# Patient Record
Sex: FEMALE | Race: WHITE
[De-identification: names, ages, dates, MRNs, and addresses within clinical notes are randomized per-mention and may not be internally consistent; named-entity substitution may affect disease eponyms.]

---

## 2019-01-18 ENCOUNTER — HOSPITAL ENCOUNTER (EMERGENCY)
Dept: HOSPITAL 25 - UCEAST | Age: 59
Discharge: HOME | End: 2019-01-18
Payer: SELF-PAY

## 2019-01-18 VITALS — SYSTOLIC BLOOD PRESSURE: 142 MMHG | DIASTOLIC BLOOD PRESSURE: 84 MMHG

## 2019-01-18 DIAGNOSIS — Z88.0: ICD-10-CM

## 2019-01-18 DIAGNOSIS — H02.841: Primary | ICD-10-CM

## 2019-01-18 DIAGNOSIS — R51: ICD-10-CM

## 2019-01-18 DIAGNOSIS — F17.210: ICD-10-CM

## 2019-01-18 PROCEDURE — 70450 CT HEAD/BRAIN W/O DYE: CPT

## 2019-01-18 PROCEDURE — G0463 HOSPITAL OUTPT CLINIC VISIT: HCPCS

## 2019-01-18 PROCEDURE — 99212 OFFICE O/P EST SF 10 MIN: CPT

## 2019-01-18 PROCEDURE — 96372 THER/PROPH/DIAG INJ SC/IM: CPT

## 2019-01-18 NOTE — UC
Eye Complaint HPI





- HPI Summary


HPI Summary: 





The patient is a 58-year-old female that for the past few days has had some 

minor irritation of her right eye.  She states it feels a little irritated at 

the temporal aspect of the upper lid.  She denies any foreign body sensation.  

He has had some tearing.  This morning she woke up with her right eye swollen 

almost completely closed.  She states that she has a right hemicranial 

headache.  She states that the skin of her right eye and temporal are 

sensitive.  She denies any rash.  She denies any photophobia or change in her 

vision.





- History of Current Complaint


Chief Complaint: UCEye


Stated Complaint: EYE COMPLAINT


Time Seen by Provider: 01/18/19 09:03


Hx Obtained From: Patient


Onset/Duration: Gradual Onset, Lasting Days, Worse Since - since this AM


Timing: Constant


Severity Initially: Mild


Severity Currently: Severe


Pain Intensity: 10


Pain Scale Used: 0-10 Numeric


Location of Injury: Eye Lid (upper) - R


Character: Sharp


Aggravating Factor(s): Nothing


Associated Signs And Symptoms: Positive: Drainage (Clear), Swelling - of right 

lids.  Negative: Photophobia, Drainage (Purulent), Vision Impairment Bilateral, 

Vision Impairment Right, Vision Impairment Left, Fever





- Allergies/Home Medications


Allergies/Adverse Reactions: 


 Allergies











Allergy/AdvReac Type Severity Reaction Status Date / Time


 


Penicillins Allergy  Hives Verified 01/18/19 09:00














PMH/Surg Hx/FS Hx/Imm Hx


Previously Healthy: Yes


Cancer History: Breast Cancer





- Surgical History


Surgical History: Yes


Surgery Procedure, Year, and Place: C SECTION, 1987, [facility].  LEFT 

BREAST MASTECTOMY, 2004, [facility]





- Family History


Known Family History: Positive: Hypertension





- Social History


Alcohol Use: Occasionally


Substance Use Type: Marijuana


Smoking Status (MU): Heavy Every Day Tobacco Smoker


Type: Cigarettes


Amount Used/How Often: 1 ppd


Household Exposure Type: Cigarettes





Review of Systems


All Other Systems Reviewed And Are Negative: Yes


Constitutional: Positive: Negative


Skin: Positive: Negative


Eyes: Positive: Drainage - tearing, Eye Redness - R.  Negative: Blurred Vision, 

Diplopia, Photophobia


ENT: Positive: Negative


Respiratory: Positive: Negative


Cardiovascular: Positive: Negative


Gastrointestinal: Positive: Vomiting, Nausea


Genitourinary: Positive: Negative


Motor: Positive: Negative


Neurovascular: Positive: Negative


Musculoskeletal: Positive: Negative


Neurological: Positive: Headache


Psychological: Positive: Negative





Physical Exam


Triage Information Reviewed: Yes


Appearance: Well-Appearing, Pain Distress, Thin


Vital Signs: 


 Initial Vital Signs











Temp  97.6 F   01/18/19 08:55


 


Pulse  67   01/18/19 08:55


 


Resp  20   01/18/19 08:55


 


BP  142/84   01/18/19 08:55


 


Pulse Ox  97   01/18/19 08:55











Eyes: Positive: Conjunctiva Inflamed - Right, Other: - Lid edema, no rash, no 

FB noted, (-) flourescein staing defect, IOP 20 by Schiotz


ENT: Positive: Hearing grossly normal, Pharynx normal, Uvula midline.  Negative

: Nasal congestion, Nasal drainage, Tonsillar swelling, Tonsillar exudate, 

Sinus tenderness


Dental: Positive: Other: - absent teeth


Respiratory: Positive: Lungs clear, Normal breath sounds, No respiratory 

distress, No accessory muscle use


Cardiovascular: Positive: RRR, No Murmur


Musculoskeletal: Positive: ROM Intact, No Edema


Neurological: Positive: Alert


Psychological Exam: Normal


Skin Exam: Normal - except lid edema....no redness or rash





Diagnostics





- Radiology


  ** No standard instances


Radiology Interpretation Completed By: Radiologist


Summary of Radiographic Findings: CT brain (-)





Re-Evaluation





- Re-Evaluation


  ** First Eval


Re-Evaluation Time: 11:03


Change: Improved - pain 3/10, refuses blood work





Eye Complaint Course/Dx





- Course


Course Of Treatment: refuses referal to EYE DOCTOR.  declines blood work





- Differential Dx/Diagnosis


Provider Diagnosis: 


 Edema of right eyelid, Headache








Discharge





- Sign-Out/Discharge


Documenting (check all that apply): Patient Departure


All imaging exams completed and their final reports reviewed: Yes





- Discharge Plan


Condition: Improved


Disposition: HOME


Prescriptions: 


Naproxen Sodium [Naproxen Sodium 500 MG TAB] 500 mg PO BID PRN #20 tab


 PRN Reason: Pain


Patient Education Materials:  Acute Headache (ED), Eye Pain (ED)


Referrals: 


Karla Costello MD [Primary Care Provider] - 3 Days


Additional Instructions: 


TO ER FOR NEW OR WORSENING SYMPTOMS


If you develop a rash around your eye please get rechecked ASAP





Any visual disturbances go to the ER











- Billing Disposition and Condition


Condition: IMPROVED


Disposition: Home

## 2020-03-26 ENCOUNTER — HOSPITAL ENCOUNTER (INPATIENT)
Dept: HOSPITAL 25 - ED | Age: 60
LOS: 2 days | Discharge: HOME | DRG: 80 | End: 2020-03-28
Attending: INTERNAL MEDICINE | Admitting: INTERNAL MEDICINE
Payer: COMMERCIAL

## 2020-03-26 DIAGNOSIS — Z80.0: ICD-10-CM

## 2020-03-26 DIAGNOSIS — F17.210: ICD-10-CM

## 2020-03-26 DIAGNOSIS — Z79.82: ICD-10-CM

## 2020-03-26 DIAGNOSIS — Z85.3: ICD-10-CM

## 2020-03-26 DIAGNOSIS — H05.012: Primary | ICD-10-CM

## 2020-03-26 DIAGNOSIS — I73.9: ICD-10-CM

## 2020-03-26 DIAGNOSIS — Z82.49: ICD-10-CM

## 2020-03-26 DIAGNOSIS — Z88.8: ICD-10-CM

## 2020-03-26 DIAGNOSIS — Z90.10: ICD-10-CM

## 2020-03-26 DIAGNOSIS — Z88.0: ICD-10-CM

## 2020-03-26 LAB
ALBUMIN SERPL BCG-MCNC: 4.1 G/DL (ref 3.2–5.2)
ALBUMIN/GLOB SERPL: 1.2 {RATIO} (ref 1–3)
ALP SERPL-CCNC: 79 U/L (ref 34–104)
ALT SERPL W P-5'-P-CCNC: 10 U/L (ref 7–52)
ANION GAP SERPL CALC-SCNC: 8 MMOL/L (ref 2–11)
AST SERPL-CCNC: 12 U/L (ref 13–39)
BASOPHILS # BLD AUTO: 0.1 10^3/UL (ref 0–0.2)
BUN SERPL-MCNC: 12 MG/DL (ref 6–24)
BUN/CREAT SERPL: 25 (ref 8–20)
CALCIUM SERPL-MCNC: 10 MG/DL (ref 8.6–10.3)
CHLORIDE SERPL-SCNC: 99 MMOL/L (ref 101–111)
EOSINOPHIL # BLD AUTO: 0 10^3/UL (ref 0–0.6)
GLOBULIN SER CALC-MCNC: 3.5 G/DL (ref 2–4)
GLUCOSE SERPL-MCNC: 90 MG/DL (ref 70–100)
HCO3 SERPL-SCNC: 26 MMOL/L (ref 22–32)
HCT VFR BLD AUTO: 40 % (ref 35–47)
HGB BLD-MCNC: 13.6 G/DL (ref 12–16)
LYMPHOCYTES # BLD AUTO: 1.3 10^3/UL (ref 1–4.8)
MCH RBC QN AUTO: 31 PG (ref 27–31)
MCHC RBC AUTO-ENTMCNC: 34 G/DL (ref 31–36)
MCV RBC AUTO: 92 FL (ref 80–97)
MONOCYTES # BLD AUTO: 0.5 10^3/UL (ref 0–0.8)
NEUTROPHILS # BLD AUTO: 3.6 10^3/UL (ref 1.5–7.7)
NRBC # BLD AUTO: 0 10^3/UL
NRBC BLD QL AUTO: 0
PLATELET # BLD AUTO: 458 10^3/UL (ref 150–450)
POTASSIUM SERPL-SCNC: 4 MMOL/L (ref 3.5–5)
PROT SERPL-MCNC: 7.6 G/DL (ref 6.4–8.9)
RBC # BLD AUTO: 4.36 10^6 /UL (ref 3.7–4.87)
SODIUM SERPL-SCNC: 133 MMOL/L (ref 135–145)
WBC # BLD AUTO: 5.4 10^3/UL (ref 3.5–10.8)

## 2020-03-26 PROCEDURE — 96367 TX/PROPH/DG ADDL SEQ IV INF: CPT

## 2020-03-26 PROCEDURE — 86140 C-REACTIVE PROTEIN: CPT

## 2020-03-26 PROCEDURE — 80202 ASSAY OF VANCOMYCIN: CPT

## 2020-03-26 PROCEDURE — G0378 HOSPITAL OBSERVATION PER HR: HCPCS

## 2020-03-26 PROCEDURE — 82565 ASSAY OF CREATININE: CPT

## 2020-03-26 PROCEDURE — 70481 CT ORBIT/EAR/FOSSA W/DYE: CPT

## 2020-03-26 PROCEDURE — 80053 COMPREHEN METABOLIC PANEL: CPT

## 2020-03-26 PROCEDURE — 36415 COLL VENOUS BLD VENIPUNCTURE: CPT

## 2020-03-26 PROCEDURE — 99284 EMERGENCY DEPT VISIT MOD MDM: CPT

## 2020-03-26 PROCEDURE — 96365 THER/PROPH/DIAG IV INF INIT: CPT

## 2020-03-26 PROCEDURE — 85025 COMPLETE CBC W/AUTO DIFF WBC: CPT

## 2020-03-26 RX ADMIN — VANCOMYCIN HYDROCHLORIDE SCH MLS/HR: 750 INJECTION, POWDER, LYOPHILIZED, FOR SOLUTION INTRAVENOUS at 22:11

## 2020-03-26 RX ADMIN — ACETAMINOPHEN PRN MG: 325 TABLET ORAL at 16:13

## 2020-03-26 RX ADMIN — ACETAMINOPHEN PRN MG: 325 TABLET ORAL at 22:10

## 2020-03-26 RX ADMIN — CEFEPIME HYDROCHLORIDE SCH MLS/HR: 1 INJECTION, SOLUTION INTRAVENOUS at 16:03

## 2020-03-26 NOTE — ED
Throat Pain/Nasal Congestion





- HPI Summary


HPI Summary: 


59 year old F presenting to [facility] with a chief complaint of a headache 2 days 

ago and worsening left eye redness and swelling since yesterday. Patient 

reports some blurry vision in her left eye and pain with movement of her left 

eye. The patient rates the pain 6/10 in severity. Symptoms aggravated by 

nothing. Symptoms alleviated by nothing. Patient denies any fever, chills, 

nausea, vomiting, or itching. Medication list reviewed. Allergy list reviewed.


 Home Medications











 Medication  Instructions  Recorded  Confirmed  Type


 


Acetaminophen [Tylenol] 2 tab PO ONCE PRN 01/16/14 01/18/19 History


 


Naproxen Sodium [Naproxen Sodium 500 mg PO BID PRN #20 tab 01/18/19  Rx





500 MG TAB]    














- History of Current Complaint


Chief Complaint: EDEyeProblem


Time Seen by Provider: 03/26/20 10:29


Hx Obtained From: Patient


Onset/Duration: Lasting Days, Still Present





- Allergies/Home Medications


Allergies/Adverse Reactions: 


 Allergies











Allergy/AdvReac Type Severity Reaction Status Date / Time


 


Penicillins Allergy  Hives Verified 03/26/20 10:13











Home Medications: 


 Home Medications





Acetaminophen TAB* [Tylenol TAB*] 325 mg PO Q6H PRN 03/26/20 [History Confirmed 

03/26/20]


Aspirin EC TAB* [Ecotrin EC Low Dose 81 MG*] 81 mg PO DAILY 03/26/20 [History 

Confirmed 03/26/20]











PMH/Surg Hx/FS Hx/Imm Hx


Cardiovascular History: 


   Denies: Other Cardiovascular Problems/Disorders


Respiratory History: 


   Denies: Other Respiratory Problems/Disorders


GI History: 


   Denies: Other GI Disorders


Musculoskeletal History: Reports: Hx Osteoporosis, Other Musculoskeletal 

History - OSTEOPOROSIS


Sensory History: Reports: Hx Contacts or Glasses - GLASSES


   Denies: Hx Hearing Aid


Opthamlomology History: Reports: Hx Contacts or Glasses - GLASSES


Neurological History: Reports: Hx Migraine


   Denies: Other Neuro Impairments/Disorders





- Cancer History


Cancer Type, Location and Year: breast left


Hx Chemotherapy: Yes





- Surgical History


Surgery Procedure, Year, and Place: C SECTION, 1987, [facility].  LEFT 

BREAST MASTECTOMY, 2004, [facility]


Hx Anesthesia Reactions: No


Infectious Disease History: No


Infectious Disease History: 


   Denies: History Other Infectious Disease, Traveled Outside the  in Last 30 

Days





- Family History


Known Family History: Positive: Hypertension





- Social History


Alcohol Use: Occasionally


Substance Use Type: Reports: Marijuana


Smoking Status (MU): Heavy Every Day Tobacco Smoker


Type: Cigarettes


Amount Used/How Often: 1 ppd





Review of Systems


Negative: Fever, Chills


Positive: Blurred Vision, Other - Left eye redness and swelling, pain with 

movement of left eye


Negative: Vomiting, Nausea


Positive: Headache


All Other Systems Reviewed And Are Negative: Yes





Physical Exam





- Summary


Physical Exam Summary: 


Constitutional: Well-developed, Well-nourished, Alert. (-) Distressed


Skin: Warm, Dry


HENT: Normocephalic; Atraumatic


Eyes: Erythema surrounding the left eye including the eyelid, edematous as well

, no obvious proptosis, she has pain with extraocular movement but is able to 

move her eye, ecchymosis to the lower haf of her eye, no hypopyon or hyphema.


Neck: Musculoskeletal ROM normal neck. (-) JVD, (-) Stridor, (-) Tracheal 

deviation


Cardio: Rhythm regular, rate normal, Heart sounds normal; Intact distal pulses; 

Radial pulses are 2+ and symmetric. (-) Murmur


Pulmonary/Chest wall: Effort normal. (-) Respiratory distress, (-) Wheezes, (-) 

Rales


Abd: Soft, (-) tenderness, (-) Distension, (-) Guarding, (-) Rebound


Musculoskeletal: (-) Edema


Lymph: (-) Cervical adenopathy


Neuro: Alert, Oriented x3


Psych: Mood and affect Normal


Triage Information Reviewed: Yes


Vital Signs On Initial Exam: 


 Initial Vitals











Temp Pulse Resp BP Pulse Ox


 


 98 F   77   16   163/99   99 


 


 03/26/20 10:10  03/26/20 10:10  03/26/20 10:10  03/26/20 10:10  03/26/20 10:10











Vital Signs Reviewed: Yes





Procedures





- Sedation


Patient Received Moderate/Deep Sedation with Procedure: No





Diagnostics





- Vital Signs


 Vital Signs











  Temp Pulse Resp BP Pulse Ox


 


 03/26/20 10:10  98 F  77  16  163/99  99














- Laboratory


Result Diagrams: 


 03/26/20 10:48





 03/26/20 10:48


Lab Statement: Any lab studies that have been ordered have been reviewed, and 

results considered in the medical decision making process.





- CT


  ** Orbit CT


CT Interpretation Completed By: Radiologist


Summary of CT Findings: Inflammatory changes are observed in the left orbit and 

left periorbital/preseptal soft tissues. No organized fluid collection is 

identified. These nonspecific findings are compatible with the provided history 

of orbital cellulitis. This patient would likely benefit from ophthalmology 

consultation.  ED physician has reviewed this report.





EENT Course/Dx





- Course


Course Of Treatment: Patient is here with symptoms concerning for orbital 

cellulitis.  Patient has periorbital erythema, chemosis of the eye, and pain 

with extraocular movements.  Patient had bloodwork performed which was grossly 

unremarkable.  Patient a CT scan of her orbits which showed evidence of orbital 

infection.  Patient had no drainable abscess.  Ophthalmology was called and 

will see the patient.  Patient is given vancomycin and Rocephin.  Patient is 

admitted to the hospitalist





- Diagnoses


Provider Diagnoses: 


 Orbital cellulitis, left








- Provider Notifications


Discussed Care Of Patient With: Lizette Valdes


Time Discussed With Above Provider: 12:45


Instructed by Provider To: Other - Discussed with Dr. Valdes who recommends to 

make sure that ophthalmology will come to see the patient. [12:50] Discussed 

with Dr. Boggs who will come see the patient today. [12:54] Discussed with 

Dr. Valdes who accepts the patient for admission.





Discharge ED





- Sign-Out/Discharge


Documenting (check all that apply): Patient Departure





- Discharge Plan


Condition: Stable


Disposition: ADMITTED TO [facility]





- Billing Disposition and Condition


Condition: STABLE


Disposition: Admitted to [facility]





- Attestation Statements


Document Initiated by Shawn: Yes


Documenting Scribe: Judy Roy


Provider For Whom Rakeshibjeff is Documenting (Include Credential): Pj Akins MD


Scribe Attestation: 


Judy GRISSOM scribed for Pj Akins MD on 03/26/20 at 1602. 


Scribe Documentation Reviewed: Yes


Provider Attestation: 


The documentation as recorded by the Judy knott accurately 

reflects the service I personally performed and the decisions made by me, Pj Akins MD


Status of Scribe Document: Viewed

## 2020-03-26 NOTE — CONSULT
Consult


Consult: 


OPHTHALMOLOGY CONSULT NOTE





CC: left eye swelling





HPI: 60yo female, with no significant ocular history, presents with 2 day 

history of left periorbital pain. redness and swelling. Reported as progressive

, mild exacerbation with eye movement, no diplopia, mild blurring in vision 

left eye. No new symptoms right eye.  She reports some mild eye pain and eyelid 

swelling last year that resolved on its own. No previous eye injuries, 

surgeries. No instigating cause.





POH: glasses


Allergies: PCN





Exam:


Vision: 20/50 od cc near, 20/50 os cc near


Pupils: ERRL, no APD


motility: full OU, mild discomfort OS on left gaze





Lids: wnl OD. 3+ TALIA/LLL erythema/edema. no vesicular rash


Conjunctiva: w/q OD. 3+ erythema with mild chemosis, greatest inferior left eye


cornea: clear ou


Anterior chamber: clear ou. no hypopyon OS


Lens: 2+ NS





Red reflex OU





CT Orbit w/wo: Left preseptal soft tissue swelling with fat stranding posterior 

to left globe. No visible abscess. Sinuses clear. 





A/P





1: Left orbital cellulitis 





-CT scan and clinical exam confirms orbital process. No signs of optic 

neuropathy.


-the most common cause for infectious orbital cellulitis is an accompanying 

sinusitis, which is notably absent for her. Other potential etiologies could 

have been a lid infection the spread post septal, but less common so will need 

to reconsider if clinical course is atypical. If this condition fails to 

improve on antibiotics, then idiopathic orbital inflammation will be considered.





-At this time, I agree with initiating IV antibiotics. currently on Vancomycin/

Ceftriaxone. Recommend at least 48hours on IV antibiotics. If clinically 

improving then can transition to oral antibiotic.


-also rec erythromycin ophthalmic ointment to the left eye twice a day





-I will re-evaluate again tomorrow 





call with any questions or concerns





Wale Boggs MD

## 2020-03-26 NOTE — HP
CC:  Dr. Boggs; Dr. Allison Chavez *

 

HISTORY AND PHYSICAL:

 

DATE OF ADMISSION:  20

 

PRIMARY CARE PROVIDER:  Dr. Allison Chavez.

 

CHIEF COMPLAINT:  Left eye pain.

 

HISTORY OF PRESENT ILLNESS:  Aziza Snyder is a 59-year-old female with 
history of smoking and peripheral vascular disease who also has history of 
breast cancer, status post mastectomy in the past, who presented to the 
hospital complaining of red and itchy left eye.  The patient stated that it all 
started 24 hours prior to her presentation when she started having itchiness 
and redness of the left eye. When she woke up the very next day, her entire 
periorbital area became very erythematous and she also has swelling of the area 
and conjunctival edema.  She was seen in the ED and Dr. Akins from the ED 
discussed case with Dr. Boggs, who is the ophthalmologist on call.  Dr. Boggs is going to see the patient in consultation and recommended overnight 
observation.  The patient is allergic to PENICILLIN, so she is going to be 
admitted and treated with vancomycin and cefepime and kept for observation.

 

Please note that patient denies any fevers, chills, and otherwise had been in 
her usual state of health.

 

PAST MEDICAL HISTORY:

1.  Peripheral vascular disease.

2.  Current smoker.

3.  Breast cancer in , status post left-sided mastectomy and 6 months of 
chemotherapy.

 

CURRENT MEDICATIONS:

1.  Aspirin 81 mg daily.

2.  Acetaminophen on p.r.n. basis.

 

ALLERGIES:  PENICILLIN caused hives.  ATORVASTATIN caused patient to be 
nauseated.

 

FAMILY HISTORY:  Father  in his 60s secondary to pancreatic cancer.  Mother 
is well and alive, although she had 3 heart attacks and it started developing 
when she was in her 50s.

 

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  
She lives with her , who is her surrogate.  Her husbands' name is Alfredo.  
She is a full code.  She drinks up to 6 beers a day and she smokes 1 pack per 
day, although she cut down within the past couple of months to half a pack per 
day.  She had been smoking most of her adult life and she started when she was 
17.

 

REVIEW OF SYSTEMS:  History of intermittent claudication, which is chronic for 
the patient.  All the other positives were included in the history of present 
illness. All the other 12 systems were reviewed and were otherwise negative.

 

                               PHYSICAL EXAMINATION

 

GENERAL:  The patient is very pleasant 59-year-old female who is in no acute 
distress.  The patient is of thin body habitus with a BMI of 18.8, and the 
patient is alert and oriented x3.

 

VITAL SIGNS:  Blood pressure 167/89, heart rate of 61 and regular, respiratory 
rate 16, oxygen saturation 99% on room air, temperature 98.0.

 

HEENT:  Head:  Atraumatic, normocephalic.  Eyes:  The left eye with severe 
periorbital edema and erythema surrounding the entire upper and lower eyelids. 
There is no significant streaking of the erythema to the remaining parts of the 
patient's face.  The patient has also conjunctival edema and subconjunctival 
hemorrhage noted.  The pupil is round and reactive to light.  Oropharynx with 
very poor dentition.

 

NECK:  Supple.  No JVD.  No bruits bilaterally.

 

RESPIRATORY:  Clear to auscultation bilaterally.

 

CARDIOVASCULAR:  Regular rate and rhythm.  No murmur.

 

ABDOMEN:  Soft, nontender.  Bowel sounds present in all 4 quadrants.

 

EXTREMITIES:  Poorly palpable peripheral pulses, but they are warm and there is 
good capillary refill, although slightly prolonged.  There is no clubbing, no 
cyanosis.  The patient has overgrown corn-like tissue on the bottom of both 
feet in the areas of the first metatarsophalangeal area on the plantar aspect 
of both feet. She also has verrucous lesion on her right heel, right above the 
right heel, overlying the area of the attachment of Achilles tendon.  The 
verruca is large, approximately 4 cm in diameter.  The patient stated that it 
was chronic, had been removed in the past and keeps on coming back.

 

NEUROLOGIC:  Cranial nerves II through XII grossly intact.  Motor strength is 5/
5 bilaterally.

 

DIAGNOSTIC STUDIES AND LAB DATA: White blood cell count 5.4, hemoglobin 13.6, 
hematocrit 40, and platelets 458.

Sodium was 133, potassium 4.0, chloride 99, carbon dioxide 26, BUN 12, 
creatinine 0.48.  Liver function tests unremarkable.  C-reactive protein of 
10.66.

 

CT of the orbit obtained in the ED, impression:  "Inflammatory changes observed 
in the left orbit and left periorbital preseptal soft tissues.  No organized 
fluid collections identified.  There are nonspecific findings that are 
compatible with provided history of orbital cellulitis.  The patient would 
likely benefit from ophthalmology consultation."

 

ASSESSMENT AND PLAN:

1.  For orbital cellulitis, the patient is going to be placed on overnight 
observation.  So far, she had been afebrile, nontoxic appearing, and 
hemodynamically stable.  She is going to be placed on continuation of 
vancomycin that was started in the ED.  I also placed her on cefepime to cover 
pseudomonas. Dr. Boggs from Ophthalmology will see the patient today in 
ophthalmology consultation.  The patient is going to be placed on observation.

2.  In regards to the patient's peripheral vascular disease, this is chronic.  
The patient is scheduled to see vascular surgeon in the future.  Baby aspirin 
will be continued.  The patient is intolerant to Lipitor.

3.  For DVT prophylaxis, the patient is going to be placed on full ambulation 
and otherwise she is low risk.

4.  The patient's code status is full .  Her surrogate is her .

 

TIME SPENT:  Approximately 55 minutes was spent on admission of this patient, 
more than half that time was spent face-to-face with the patient during the 
interview and physical exam.

 

 

 

[id number]/[facility] #: [id number]

MTDD

## 2020-03-27 RX ADMIN — CEFEPIME HYDROCHLORIDE SCH MLS/HR: 1 INJECTION, SOLUTION INTRAVENOUS at 03:57

## 2020-03-27 RX ADMIN — VANCOMYCIN HYDROCHLORIDE SCH: 750 INJECTION, POWDER, LYOPHILIZED, FOR SOLUTION INTRAVENOUS at 15:51

## 2020-03-27 RX ADMIN — CEFEPIME HYDROCHLORIDE SCH MLS/HR: 1 INJECTION, SOLUTION INTRAVENOUS at 17:32

## 2020-03-27 RX ADMIN — ERYTHROMYCIN SCH APPLIC: 5 OINTMENT OPHTHALMIC at 09:09

## 2020-03-27 RX ADMIN — VANCOMYCIN HYDROCHLORIDE SCH MLS/HR: 750 INJECTION, POWDER, LYOPHILIZED, FOR SOLUTION INTRAVENOUS at 05:57

## 2020-03-27 RX ADMIN — VANCOMYCIN HYDROCHLORIDE SCH MLS/HR: 1 INJECTION, POWDER, LYOPHILIZED, FOR SOLUTION INTRAVENOUS at 15:49

## 2020-03-27 RX ADMIN — ERYTHROMYCIN SCH APPLIC: 5 OINTMENT OPHTHALMIC at 21:21

## 2020-03-27 RX ADMIN — ASPIRIN SCH MG: 81 TABLET, COATED ORAL at 09:08

## 2020-03-27 RX ADMIN — ACETAMINOPHEN PRN MG: 325 TABLET ORAL at 21:25

## 2020-03-27 RX ADMIN — VANCOMYCIN HYDROCHLORIDE SCH MLS/HR: 1 INJECTION, POWDER, LYOPHILIZED, FOR SOLUTION INTRAVENOUS at 23:34

## 2020-03-27 NOTE — PN
Subjective


Date of Service: 03/27/20


Interval History: 





Pt feels well. Left eye "feels much better", denies pain, fever





Objective


Active Medications: 








Acetaminophen (Tylenol Tab*)  650 mg PO Q4H PRN


   PRN Reason: PAIN-MILD/TEMP >/= 100.4


   Last Admin: 03/26/20 22:10 Dose:  650 mg


Aspirin (Aspirin Ec Tab*)  81 mg PO DAILY [facility]


   Last Admin: 03/27/20 09:08 Dose:  81 mg


Erythromycin (Erythromycin Opth Oint*)  1 applic LEFT EYE BID [facility]


   Last Admin: 03/27/20 09:09 Dose:  1 applic


Cefepime HCl (Maxipime 1 Gm In Dextrose Duplex (*))  1 gm in 50 mls @ 100 mls/

hr IV Q12H [facility]


   Last Admin: 03/27/20 03:57 Dose:  100 mls/hr


Vancomycin HCl 750 mg/ Sodium (Chloride)  250 mls @ 166.667 mls/hr IVPB Q8H [facility]


   Last Admin: 03/27/20 05:57 Dose:  166.667 mls/hr


Pharmacy Consult (Vancomycin Per Pharmacy*)  1 note FOLLOW UP .VANC PER 

PHARMACY [facility]; Protocol


Pharmacy Profile Note (Vancomycin Trough Check)  1 note FOLLOW UP 1400 ONE


   Stop: 03/27/20 14:01








 Vital Signs - 8 hr











  03/27/20 03/27/20 03/27/20





  03:35 07:23 07:49


 


Temperature 97.3 F 97.6 F 


 


Pulse Rate 58 53 


 


Respiratory 16 16 16





Rate   


 


Blood Pressure 137/70 162/75 





(mmHg)   


 


O2 Sat by Pulse 100 100 





Oximetry   











Oxygen Devices in Use Now: None


Appearance: 60 yo f in nAD, aAOx3


Eyes: No Scleral Icterus, PERRLA, - - left eye conjunctival edema resolved, 

periorbital cellulitis-almost resolved


Ears/Nose/Mouth/Throat: NL Teeth, Lips, Gums


Neck: NL Appearance and Movements; NL JVP


Respiratory: Symmetrical Chest Expansion and Respiratory Effort


Cardiovascular: NL Sounds; No Murmurs; No JVD, RRR


Abdominal: NL Sounds; No Tenderness; No Distention


Lymphatic: No Cervical Adenopathy


Extremities: No Edema


Skin: No Rash or Ulcers


Neurological: Alert and Oriented x 3, NL Muscle Strength and Tone


Result Diagrams: 


 03/26/20 10:48





 03/27/20 04:14





Assess/Plan/Problems-Billing


Assessment: 60 yo f with h/o smoking , PVD presents with left orbital cellulitis











- Patient Problems


(1) Orbital cellulitis on left


Comment: appreciate Dr. Boggs's consult. Con IV antibiotics x 1 more day, 

then d/c. Also started


Erythtromycin ointment topically.   





(2) DVT prophylaxis


Comment: low risk-ambulation   


Status and Disposition: 


OBV

## 2020-03-27 NOTE — CONSULT
Consult


Consult: 





OPHTHALMOLOGY CONSULT NOTE





Subjective: Pt reports significant improvment in pain and swelling. Able to 

open eye. No vision changes or diplopia. No acute issues. Eager to go home.





Exam:


Vision: 20/50 od cc near, 20/50 os cc near


Pupils: ERRL, no APD


motility: full OU, no pain





Lids: wnl OD. 1+ TALIA/LLL erythema/edema. no vesicular rash


Conjunctiva: w/q OD. 2+ erythema with mild chemosis, greatest inferior left eye


cornea: clear ou


Anterior chamber: clear ou. no hypopyon OS


Lens: 2+ NS





Red reflex OU








A/P





1: Left orbital cellulitis 





-significant improvement subjectively and in exam. 


-I would recommend continued IV antibiotics through tomorrow.





-if clinical course continues to improve then could discharge home tomorrow 

afternoon after dose of IV dose, on oral antibiotic (10day course of either 

Doxycycline 100mg twice daily or Bactrim Double strength twice a day)





-she should follow up with me in clinic on either monday or tuesday ([facility] 

[facility], [address] [phone]. We will call her also)





call with any questions or concerns





Wale Boggs MD

## 2020-03-28 VITALS — DIASTOLIC BLOOD PRESSURE: 76 MMHG | SYSTOLIC BLOOD PRESSURE: 146 MMHG

## 2020-03-28 RX ADMIN — VANCOMYCIN HYDROCHLORIDE SCH MLS/HR: 1 INJECTION, POWDER, LYOPHILIZED, FOR SOLUTION INTRAVENOUS at 08:19

## 2020-03-28 RX ADMIN — ASPIRIN SCH MG: 81 TABLET, COATED ORAL at 08:20

## 2020-03-28 RX ADMIN — ERYTHROMYCIN SCH APPLIC: 5 OINTMENT OPHTHALMIC at 08:20

## 2020-03-28 RX ADMIN — CEFEPIME HYDROCHLORIDE SCH MLS/HR: 1 INJECTION, SOLUTION INTRAVENOUS at 04:04

## 2020-03-28 NOTE — DS
CC:  Dr. Boggs; Dr. Allison Chavez *

 

DISCHARGE SUMMARY:

 

DATE OF ADMISSION:  03/26/20

 

DATE OF DISCHARGE:  03/28/20

 

PRIMARY CARE PROVIDER:  Allison Chaevz DO

 

DISPOSITION AT DISCHARGE:  Home.

 

CONDITION AT DISCHARGE:  Stable.

 

DISCHARGE DIAGNOSIS:  Left orbital cellulitis.

 

MEDICATIONS AT DISCHARGE:

1.  Acetaminophen on a p.r.n. basis.

2.  Aspirin 81 mg daily.

3.  Doxycycline 100 mg b.i.d. for total of 10 days then stop.

4.  Erythromycin ophthalmic ointment 1 application to the left eye twice a day.

 

LABORATORY DATA AND STUDIES PERFORMED DURING THE HOSPITAL STAY:  Unchanged from 
admission.

 

CONSULTATION DURING THE HOSPITAL STAY:  Included Dr. Chris Boggs from 
Ophthalmology.

 

HOSPITALIZATION COURSE:  Aziza Snyder is a 59-year-old female with history 
of peripheral vascular disease and smoking, who presented to the hospital 
complaining of left eye swelling and pain on 03/26/20.  She was diagnosed with 
orbital cellulitis.  She was afebrile and nontoxic appearing.  She was placed 
on broad- spectrum antibiotics including cefepime and vancomycin. Dr. Boggs 
saw the patient in consultation, recommended erythromycin ointment in addition 
to her current antibiotics.  Dr. Boggs also recommended for the patient to 
stay in the hospital until the afternoon dose of her antibiotics on 03/28/20.  
Today, the patient is going to go home after antibiotics mid day.  Her left eye 
periorbital cellulitis is almost gone.  She feels well.  She has no pain.  She 
is going to follow up with Dr. Boggs and call their office on Monday, 03/30/
20 for an appointment.

 

The patient was recommended to have Dr. Allison Chavez and call for an 
appointment in the next 7 days.

 

PHYSICAL EXAMINATION:  At the time of discharge, blood pressure of 146/76, 
heart rate of 54, and regular, respiratory rate 22, oxygen saturation 98% on 
room air, temperature 99.0.  General:  The patient is a very pleasant 

59-year-old female, who was in no acute distress.  The patient is alert and 
oriented x3.  HEENT:  Head: Atraumatic, normocephalic.  Eyes:  Pupils are equal
, reactive to light and accommodation.  Oropharynx is clear.  Mucosa moist.  
Neck:  Supple.  No JVD.  No bruits bilaterally.  Cardiovascular:  Regular rate 
and rhythm.  No murmur. Respiratory:  Clear to auscultation bilaterally.  
Abdomen:  Soft, nontender.  Bowel sounds are present in all 4 quadrants.  
Extremities:  There is no edema.  Pulses are poorly palpable, but present 
bilaterally.  There is no clubbing and no cyanosis.  On evaluation of the skin 
in the periorbital area, the cellulitis is basically gone with slight scleral 
erythema in the left eye.

 

Please note that this is a short summary of the patient's hospitalization.  
Please refer to further medical records for details.

 

TIME SPENT:  Approximately 32 minutes was spent on the patient's discharge.

 

 [id number] #: [id number]

MTDD